# Patient Record
Sex: MALE | URBAN - METROPOLITAN AREA
[De-identification: names, ages, dates, MRNs, and addresses within clinical notes are randomized per-mention and may not be internally consistent; named-entity substitution may affect disease eponyms.]

---

## 2020-12-17 ENCOUNTER — EMERGENCY (EMERGENCY)
Facility: HOSPITAL | Age: 40
LOS: 1 days | Discharge: HOME | End: 2020-12-17
Attending: STUDENT IN AN ORGANIZED HEALTH CARE EDUCATION/TRAINING PROGRAM | Admitting: STUDENT IN AN ORGANIZED HEALTH CARE EDUCATION/TRAINING PROGRAM
Payer: SELF-PAY

## 2020-12-17 VITALS
SYSTOLIC BLOOD PRESSURE: 130 MMHG | RESPIRATION RATE: 16 BRPM | TEMPERATURE: 98 F | HEART RATE: 68 BPM | OXYGEN SATURATION: 100 % | DIASTOLIC BLOOD PRESSURE: 76 MMHG

## 2020-12-17 VITALS
OXYGEN SATURATION: 100 % | TEMPERATURE: 98 F | DIASTOLIC BLOOD PRESSURE: 90 MMHG | HEART RATE: 71 BPM | RESPIRATION RATE: 14 BRPM | SYSTOLIC BLOOD PRESSURE: 136 MMHG

## 2020-12-17 PROCEDURE — 99282 EMERGENCY DEPT VISIT SF MDM: CPT

## 2020-12-17 PROCEDURE — 99053 MED SERV 10PM-8AM 24 HR FAC: CPT

## 2020-12-17 NOTE — ED PROVIDER NOTE - CLINICAL SUMMARY MEDICAL DECISION MAKING FREE TEXT BOX
41 y/o homeless M, presents to ED c/o "feeling cold." Pt was found in the lobby of an apartment building.   VSS. Physical exam unremarkable. No signs of frostbite.   Will feed pt and reassess. Likely involve SW. 41 y/o homeless M, presents to ED c/o "feeling cold." Pt was found in the lobby of an apartment building.   VSS. Physical exam unremarkable. No signs of frostbite.   Will check [facility]. Will feed pt and reassess. Likely involve SW.

## 2020-12-17 NOTE — ED ADULT NURSE REASSESSMENT NOTE - NS ED NURSE REASSESS COMMENT FT1
PT is sleeping in stretcher, easily arousable to verbal stimuli. no apparent distress noted. pt is awaiting SW, will continue to monitor.

## 2020-12-17 NOTE — ED PROVIDER NOTE - NS ED ROS FT
Gen: Denies fever, weight loss; +chills  HEENT: Denies vision changes, ear pain, epistaxis, sore throat  CV: Denies chest pain, palpitations  Skin: Denies rash, erythema, color changes  Resp: Denies SOB, cough  Endo: Denies sensitivity to heat, cold, increased urination  GI: Denies abdominal pain, constipation, nausea, vomiting, diarrhea  Msk: Denies back pain, LE swelling, extremity pain  : Denies dysuria, increased frequency  Neuro: Denies LOC, weakness, numbness, tingling  Psych: Denies hx of psych, hallucinations  ROS statement: all other ROS negative except as per HPI

## 2020-12-17 NOTE — ED PROVIDER NOTE - DISCHARGE DATE
I am not primary health care provider and refills are not appropriate . Patient is mainly seen by [facility]     Not seen by me except for once 2 years ago     Haim Bustos MD     17-Dec-2020

## 2020-12-17 NOTE — ED PROVIDER NOTE - PROGRESS NOTE DETAILS
Savannah Boucher PGY1: Pt seen by SW. Pt provided with [employer] and information on local homeless shelters. Pt currently medically cleared for d/c. Will d/c pt w/ return precautions.

## 2020-12-17 NOTE — ED PROVIDER NOTE - ATTENDING CONTRIBUTION TO CARE
Govind ANDERSON: I agree with the above provided history and exam and addend/modify it as follows.    40M w/ pmh ?depression (on fluoxetine?) - brought by EMS after found in lobby of apartment building complaining of feeling cold. Pt denies having any pain currently, denies trauma, falls, loc. Pt denies any fever, cough, n/v/d/c. Pt does agree to talking with social work for shelter information.    On exam CN intact throughout, moving all extremities spontaneously, no bruising throughout, sensation intact. not clinically intoxicated.     Plan to let rest in ED, then re-assess when more awake, discuss w/ sw, dc w/ shelter info    I Heri Faust MD performed a history and physical exam of the patient and discussed their management with the resident and /or advanced care provider. I reviewed the resident and /or ACP's note and agree with the documented findings and plan of care. My medical decision making and observations are found above.

## 2020-12-17 NOTE — ED ADULT NURSE NOTE - OBJECTIVE STATEMENT
Received pt to bed 14 at change of shift , pt is alert to verbal stimuli, ambulatory at baseline. C/O being cold. pt was found in a lobby, homeless. pt appears sleepy. blood sugar 92. provided with blankets to warm up. pt does not appear to be in any apparent distress, will continue to monitor.

## 2020-12-17 NOTE — ED PROVIDER NOTE - OBJECTIVE STATEMENT
39 y/o homeless M, presents to ED c/o "feeling cold." Pt was found in the lobby of an apartment building. Pt denies any PMHx. Pt denies any pain. 41 y/o homeless M, presents to ED c/o "feeling cold." Pt was found in the lobby of an apartment building. Pt denies any PMHx. Pt denies any pain. Denies any other complaints. 41 y/o homeless M, presents to ED c/o "feeling cold." Pt was found in the lobby of an apartment building. Pt states he often stays at a homeless shelter, but he does not remember where it is exactly. Pt denies any PMHx. Pt denies any pain. Denies any other complaints. 39 y/o homeless M, presents to ED c/o "feeling cold." Pt was found in the lobby of an apartment building. Pt states he often stays at a homeless shelter, but he does not remember where it is exactly. Pt denies any PMHx, but does state he is on Fluoxetine. Pt denies any pain. Denies trauma. Denies any other complaints.

## 2020-12-17 NOTE — ED ADULT TRIAGE NOTE - CHIEF COMPLAINT QUOTE
pt homeless, found in lobby of apartment building c/o feeling cold. pt not forthcoming about PMHx or allergies. denies pain.